# Patient Record
Sex: FEMALE | Race: WHITE | NOT HISPANIC OR LATINO | Employment: FULL TIME | URBAN - NONMETROPOLITAN AREA
[De-identification: names, ages, dates, MRNs, and addresses within clinical notes are randomized per-mention and may not be internally consistent; named-entity substitution may affect disease eponyms.]

---

## 2024-05-23 LAB
EXTERNAL CHLAMYDIA SCREEN: NEGATIVE
EXTERNAL GONORRHEA SCREEN: NEGATIVE
EXTERNAL HEPATITIS B SURFACE ANTIGEN: NEGATIVE
EXTERNAL HEPATITIS C AB: NEGATIVE
EXTERNAL RUBELLA QUALITATIVE: NORMAL
HIV1 P24 AG SERPL QL IA: NORMAL
VZV IGG SER QL: NORMAL

## 2024-09-28 ENCOUNTER — ANESTHESIA EVENT (OUTPATIENT)
Dept: LABOR AND DELIVERY | Facility: HOSPITAL | Age: 29
End: 2024-09-28
Payer: COMMERCIAL

## 2024-09-28 ENCOUNTER — HOSPITAL ENCOUNTER (INPATIENT)
Facility: HOSPITAL | Age: 29
LOS: 2 days | Discharge: HOME | End: 2024-09-30
Attending: OBSTETRICS & GYNECOLOGY | Admitting: OBSTETRICS & GYNECOLOGY
Payer: COMMERCIAL

## 2024-09-28 ENCOUNTER — ANESTHESIA (OUTPATIENT)
Dept: LABOR AND DELIVERY | Facility: HOSPITAL | Age: 29
End: 2024-09-28
Payer: COMMERCIAL

## 2024-09-28 PROBLEM — O60.03 PRETERM LABOR IN THIRD TRIMESTER WITHOUT DELIVERY: Status: RESOLVED | Noted: 2024-09-28 | Resolved: 2024-09-28

## 2024-09-28 PROBLEM — O35.10X0 SUSPECTED FETAL CHROMOSOME ANOMALY AFFECTING ANTEPARTUM CARE OF MOTHER: Status: RESOLVED | Noted: 2024-09-28 | Resolved: 2024-09-28

## 2024-09-28 PROBLEM — Z34.90 PREGNANCY: Status: RESOLVED | Noted: 2024-09-28 | Resolved: 2024-09-28

## 2024-09-28 PROBLEM — O35.10X0 SUSPECTED FETAL CHROMOSOME ANOMALY AFFECTING ANTEPARTUM CARE OF MOTHER: Status: ACTIVE | Noted: 2024-09-28

## 2024-09-28 PROBLEM — Z3A.30 30 WEEKS GESTATION OF PREGNANCY: Status: ACTIVE | Noted: 2024-09-28

## 2024-09-28 PROBLEM — O60.03 PRETERM LABOR IN THIRD TRIMESTER WITHOUT DELIVERY: Status: ACTIVE | Noted: 2024-09-28

## 2024-09-28 PROBLEM — Z34.90 PREGNANCY: Status: ACTIVE | Noted: 2024-09-28

## 2024-09-28 PROBLEM — O99.820 GROUP B STREPTOCOCCAL CARRIAGE COMPLICATING PREGNANCY: Status: ACTIVE | Noted: 2024-09-28

## 2024-09-28 LAB
ABO GROUP BLD: NORMAL
BLD GP AB SCN SERPL QL: NEGATIVE
DEPRECATED RDW RBC AUTO: 43.8 FL (ref 37–54)
ERYTHROCYTE [DISTWIDTH] IN BLOOD BY AUTOMATED COUNT: 13.5 % (ref 12.3–15.4)
HCT VFR BLD AUTO: 34.4 % (ref 34–46.6)
HGB BLD-MCNC: 11.3 G/DL (ref 12–15.9)
MCH RBC QN AUTO: 29.4 PG (ref 26.6–33)
MCHC RBC AUTO-ENTMCNC: 32.8 G/DL (ref 31.5–35.7)
MCV RBC AUTO: 89.4 FL (ref 79–97)
PLATELET # BLD AUTO: 234 10*3/MM3 (ref 140–450)
PMV BLD AUTO: 10.4 FL (ref 6–12)
RBC # BLD AUTO: 3.85 10*6/MM3 (ref 3.77–5.28)
RH BLD: POSITIVE
T&S EXPIRATION DATE: NORMAL
TREPONEMA PALLIDUM IGG+IGM AB [PRESENCE] IN SERUM OR PLASMA BY IMMUNOASSAY: NORMAL
WBC NRBC COR # BLD AUTO: 15.27 10*3/MM3 (ref 3.4–10.8)

## 2024-09-28 PROCEDURE — 51702 INSERT TEMP BLADDER CATH: CPT

## 2024-09-28 PROCEDURE — C1755 CATHETER, INTRASPINAL: HCPCS

## 2024-09-28 PROCEDURE — 25810000003 LACTATED RINGERS PER 1000 ML: Performed by: OBSTETRICS & GYNECOLOGY

## 2024-09-28 PROCEDURE — 88307 TISSUE EXAM BY PATHOLOGIST: CPT | Performed by: OBSTETRICS & GYNECOLOGY

## 2024-09-28 PROCEDURE — 25010000002 MAGNESIUM SULFATE 20 GM/500ML SOLUTION: Performed by: OBSTETRICS & GYNECOLOGY

## 2024-09-28 PROCEDURE — 86780 TREPONEMA PALLIDUM: CPT | Performed by: OBSTETRICS & GYNECOLOGY

## 2024-09-28 PROCEDURE — 85027 COMPLETE CBC AUTOMATED: CPT | Performed by: OBSTETRICS & GYNECOLOGY

## 2024-09-28 PROCEDURE — 86901 BLOOD TYPING SEROLOGIC RH(D): CPT | Performed by: OBSTETRICS & GYNECOLOGY

## 2024-09-28 PROCEDURE — 25010000002 PENICILLIN G POTASSIUM PER 600000 UNITS: Performed by: OBSTETRICS & GYNECOLOGY

## 2024-09-28 PROCEDURE — 25010000002 ROPIVACAINE PER 1 MG

## 2024-09-28 PROCEDURE — 86850 RBC ANTIBODY SCREEN: CPT | Performed by: OBSTETRICS & GYNECOLOGY

## 2024-09-28 PROCEDURE — 86900 BLOOD TYPING SEROLOGIC ABO: CPT | Performed by: OBSTETRICS & GYNECOLOGY

## 2024-09-28 RX ORDER — OXYTOCIN/0.9 % SODIUM CHLORIDE 30/500 ML
125 PLASTIC BAG, INJECTION (ML) INTRAVENOUS ONCE AS NEEDED
Status: DISCONTINUED | OUTPATIENT
Start: 2024-09-28 | End: 2024-09-30 | Stop reason: HOSPADM

## 2024-09-28 RX ORDER — HYDROCORTISONE 25 MG/G
1 CREAM TOPICAL AS NEEDED
Status: DISCONTINUED | OUTPATIENT
Start: 2024-09-28 | End: 2024-09-30 | Stop reason: HOSPADM

## 2024-09-28 RX ORDER — METHYLERGONOVINE MALEATE 0.2 MG/ML
200 INJECTION INTRAVENOUS ONCE AS NEEDED
Status: DISCONTINUED | OUTPATIENT
Start: 2024-09-28 | End: 2024-09-30 | Stop reason: HOSPADM

## 2024-09-28 RX ORDER — EPHEDRINE SULFATE 50 MG/ML
10 INJECTION, SOLUTION INTRAVENOUS
Status: DISCONTINUED | OUTPATIENT
Start: 2024-09-28 | End: 2024-09-28 | Stop reason: HOSPADM

## 2024-09-28 RX ORDER — SODIUM CHLORIDE 0.9 % (FLUSH) 0.9 %
1-10 SYRINGE (ML) INJECTION AS NEEDED
Status: DISCONTINUED | OUTPATIENT
Start: 2024-09-28 | End: 2024-09-30 | Stop reason: HOSPADM

## 2024-09-28 RX ORDER — ONDANSETRON 2 MG/ML
4 INJECTION INTRAMUSCULAR; INTRAVENOUS EVERY 6 HOURS PRN
Status: DISCONTINUED | OUTPATIENT
Start: 2024-09-28 | End: 2024-09-30 | Stop reason: HOSPADM

## 2024-09-28 RX ORDER — LIDOCAINE HYDROCHLORIDE AND EPINEPHRINE 15; 5 MG/ML; UG/ML
INJECTION, SOLUTION EPIDURAL AS NEEDED
Status: DISCONTINUED | OUTPATIENT
Start: 2024-09-28 | End: 2024-09-28 | Stop reason: SURG

## 2024-09-28 RX ORDER — LIDOCAINE HYDROCHLORIDE 20 MG/ML
INJECTION, SOLUTION EPIDURAL; INFILTRATION; INTRACAUDAL; PERINEURAL AS NEEDED
Status: DISCONTINUED | OUTPATIENT
Start: 2024-09-28 | End: 2024-09-28 | Stop reason: SURG

## 2024-09-28 RX ORDER — PROMETHAZINE HYDROCHLORIDE 25 MG/1
25 TABLET ORAL EVERY 6 HOURS PRN
Status: DISCONTINUED | OUTPATIENT
Start: 2024-09-28 | End: 2024-09-30 | Stop reason: HOSPADM

## 2024-09-28 RX ORDER — BISACODYL 10 MG
10 SUPPOSITORY, RECTAL RECTAL DAILY PRN
Status: DISCONTINUED | OUTPATIENT
Start: 2024-09-28 | End: 2024-09-28

## 2024-09-28 RX ORDER — OXYTOCIN/0.9 % SODIUM CHLORIDE 30/500 ML
250 PLASTIC BAG, INJECTION (ML) INTRAVENOUS CONTINUOUS
Status: DISPENSED | OUTPATIENT
Start: 2024-09-28 | End: 2024-09-28

## 2024-09-28 RX ORDER — MAGNESIUM SULFATE HEPTAHYDRATE 40 MG/ML
2 INJECTION, SOLUTION INTRAVENOUS CONTINUOUS
Status: DISCONTINUED | OUTPATIENT
Start: 2024-09-28 | End: 2024-09-28

## 2024-09-28 RX ORDER — HYDROCODONE BITARTRATE AND ACETAMINOPHEN 5; 325 MG/1; MG/1
1 TABLET ORAL EVERY 4 HOURS PRN
Status: DISCONTINUED | OUTPATIENT
Start: 2024-09-28 | End: 2024-09-30 | Stop reason: HOSPADM

## 2024-09-28 RX ORDER — MISOPROSTOL 200 UG/1
600 TABLET ORAL ONCE AS NEEDED
Status: DISCONTINUED | OUTPATIENT
Start: 2024-09-28 | End: 2024-09-30 | Stop reason: HOSPADM

## 2024-09-28 RX ORDER — BETAMETHASONE SOD PHOSPH-WATER 6 MG/ML
12 VIAL (ML) INJECTION ONCE
Status: DISCONTINUED | OUTPATIENT
Start: 2024-09-29 | End: 2024-09-28

## 2024-09-28 RX ORDER — PRENATAL VIT/IRON FUM/FOLIC AC 27MG-0.8MG
1 TABLET ORAL DAILY
Status: DISCONTINUED | OUTPATIENT
Start: 2024-09-28 | End: 2024-09-30 | Stop reason: HOSPADM

## 2024-09-28 RX ORDER — PRENATAL VIT NO.126/IRON/FOLIC 28MG-0.8MG
1 TABLET ORAL DAILY
COMMUNITY

## 2024-09-28 RX ORDER — DOCUSATE SODIUM 100 MG/1
100 CAPSULE, LIQUID FILLED ORAL 2 TIMES DAILY PRN
Status: DISCONTINUED | OUTPATIENT
Start: 2024-09-28 | End: 2024-09-28

## 2024-09-28 RX ORDER — PROMETHAZINE HYDROCHLORIDE 12.5 MG/1
12.5 SUPPOSITORY RECTAL EVERY 6 HOURS PRN
Status: DISCONTINUED | OUTPATIENT
Start: 2024-09-28 | End: 2024-09-30 | Stop reason: HOSPADM

## 2024-09-28 RX ORDER — HYDROXYZINE HYDROCHLORIDE 25 MG/1
50 TABLET, FILM COATED ORAL NIGHTLY PRN
Status: DISCONTINUED | OUTPATIENT
Start: 2024-09-28 | End: 2024-09-30 | Stop reason: HOSPADM

## 2024-09-28 RX ORDER — OXYTOCIN/0.9 % SODIUM CHLORIDE 30/500 ML
999 PLASTIC BAG, INJECTION (ML) INTRAVENOUS ONCE
Status: COMPLETED | OUTPATIENT
Start: 2024-09-28 | End: 2024-09-28

## 2024-09-28 RX ORDER — ACETAMINOPHEN 325 MG/1
650 TABLET ORAL EVERY 6 HOURS PRN
Status: DISCONTINUED | OUTPATIENT
Start: 2024-09-28 | End: 2024-09-30 | Stop reason: HOSPADM

## 2024-09-28 RX ORDER — ACETAMINOPHEN 325 MG/1
650 TABLET ORAL EVERY 4 HOURS PRN
Status: DISCONTINUED | OUTPATIENT
Start: 2024-09-28 | End: 2024-09-28

## 2024-09-28 RX ORDER — HYDROCODONE BITARTRATE AND ACETAMINOPHEN 10; 325 MG/1; MG/1
1 TABLET ORAL EVERY 4 HOURS PRN
Status: DISCONTINUED | OUTPATIENT
Start: 2024-09-28 | End: 2024-09-30 | Stop reason: HOSPADM

## 2024-09-28 RX ORDER — IBUPROFEN 600 MG/1
600 TABLET, FILM COATED ORAL EVERY 6 HOURS PRN
Status: DISCONTINUED | OUTPATIENT
Start: 2024-09-28 | End: 2024-09-30 | Stop reason: HOSPADM

## 2024-09-28 RX ORDER — SODIUM CHLORIDE, SODIUM LACTATE, POTASSIUM CHLORIDE, CALCIUM CHLORIDE 600; 310; 30; 20 MG/100ML; MG/100ML; MG/100ML; MG/100ML
75 INJECTION, SOLUTION INTRAVENOUS CONTINUOUS
Status: DISCONTINUED | OUTPATIENT
Start: 2024-09-28 | End: 2024-09-28

## 2024-09-28 RX ORDER — ONDANSETRON 2 MG/ML
4 INJECTION INTRAMUSCULAR; INTRAVENOUS EVERY 8 HOURS PRN
Status: DISCONTINUED | OUTPATIENT
Start: 2024-09-28 | End: 2024-09-28

## 2024-09-28 RX ORDER — FAMOTIDINE 10 MG/ML
20 INJECTION, SOLUTION INTRAVENOUS ONCE AS NEEDED
OUTPATIENT
Start: 2024-09-28

## 2024-09-28 RX ORDER — DEXMEDETOMIDINE HYDROCHLORIDE 4 UG/ML
INJECTION, SOLUTION INTRAVENOUS AS NEEDED
Status: DISCONTINUED | OUTPATIENT
Start: 2024-09-28 | End: 2024-09-28 | Stop reason: SURG

## 2024-09-28 RX ORDER — NALOXONE HCL 0.4 MG/ML
0.4 VIAL (ML) INJECTION
Status: DISCONTINUED | OUTPATIENT
Start: 2024-09-28 | End: 2024-09-30 | Stop reason: ALTCHOICE

## 2024-09-28 RX ORDER — ONDANSETRON 4 MG/1
8 TABLET, ORALLY DISINTEGRATING ORAL EVERY 8 HOURS PRN
Status: DISCONTINUED | OUTPATIENT
Start: 2024-09-28 | End: 2024-09-28

## 2024-09-28 RX ORDER — PENICILLIN G 3000000 [IU]/50ML
3 INJECTION, SOLUTION INTRAVENOUS EVERY 4 HOURS
Status: DISCONTINUED | OUTPATIENT
Start: 2024-09-28 | End: 2024-09-28

## 2024-09-28 RX ORDER — BISACODYL 10 MG
10 SUPPOSITORY, RECTAL RECTAL DAILY PRN
Status: DISCONTINUED | OUTPATIENT
Start: 2024-09-29 | End: 2024-09-30 | Stop reason: HOSPADM

## 2024-09-28 RX ORDER — CALCIUM CARBONATE 500 MG/1
2 TABLET, CHEWABLE ORAL 3 TIMES DAILY PRN
Status: DISCONTINUED | OUTPATIENT
Start: 2024-09-28 | End: 2024-09-30 | Stop reason: HOSPADM

## 2024-09-28 RX ORDER — ONDANSETRON 4 MG/1
4 TABLET, ORALLY DISINTEGRATING ORAL EVERY 8 HOURS PRN
Status: DISCONTINUED | OUTPATIENT
Start: 2024-09-28 | End: 2024-09-30 | Stop reason: HOSPADM

## 2024-09-28 RX ORDER — MORPHINE SULFATE 2 MG/ML
1 INJECTION, SOLUTION INTRAMUSCULAR; INTRAVENOUS EVERY 4 HOURS PRN
Status: DISCONTINUED | OUTPATIENT
Start: 2024-09-28 | End: 2024-09-30 | Stop reason: ALTCHOICE

## 2024-09-28 RX ORDER — CITRIC ACID/SODIUM CITRATE 334-500MG
30 SOLUTION, ORAL ORAL ONCE
Status: DISCONTINUED | OUTPATIENT
Start: 2024-09-28 | End: 2024-09-28 | Stop reason: HOSPADM

## 2024-09-28 RX ORDER — DOCUSATE SODIUM 100 MG/1
100 CAPSULE, LIQUID FILLED ORAL 2 TIMES DAILY
Status: DISCONTINUED | OUTPATIENT
Start: 2024-09-28 | End: 2024-09-30 | Stop reason: HOSPADM

## 2024-09-28 RX ADMIN — HYDROCODONE BITARTRATE AND ACETAMINOPHEN 1 TABLET: 5; 325 TABLET ORAL at 15:16

## 2024-09-28 RX ADMIN — Medication 250 ML/HR: at 15:17

## 2024-09-28 RX ADMIN — DEXMEDETOMIDINE HYDROCHLORIDE IN 0.9% SODIUM CHLORIDE 4 MCG: 4 INJECTION INTRAVENOUS at 12:19

## 2024-09-28 RX ADMIN — LIDOCAINE HYDROCHLORIDE AND EPINEPHRINE 3 ML: 15; 5 INJECTION, SOLUTION EPIDURAL at 11:52

## 2024-09-28 RX ADMIN — MAGNESIUM SULFATE HEPTAHYDRATE 2 G/HR: 40 INJECTION, SOLUTION INTRAVENOUS at 11:06

## 2024-09-28 RX ADMIN — SODIUM CHLORIDE 5 MILLION UNITS: 900 INJECTION INTRAVENOUS at 11:25

## 2024-09-28 RX ADMIN — Medication 999 ML/HR: at 14:37

## 2024-09-28 RX ADMIN — SODIUM CHLORIDE, POTASSIUM CHLORIDE, SODIUM LACTATE AND CALCIUM CHLORIDE 75 ML/HR: 600; 310; 30; 20 INJECTION, SOLUTION INTRAVENOUS at 11:06

## 2024-09-28 RX ADMIN — LIDOCAINE HYDROCHLORIDE 3 ML: 20 INJECTION, SOLUTION EPIDURAL; INFILTRATION; INTRACAUDAL; PERINEURAL at 11:47

## 2024-09-28 RX ADMIN — DOCUSATE SODIUM 100 MG: 100 CAPSULE, LIQUID FILLED ORAL at 21:58

## 2024-09-28 RX ADMIN — IBUPROFEN 600 MG: 600 TABLET, FILM COATED ORAL at 18:31

## 2024-09-28 RX ADMIN — ROPIVACAINE HYDROCHLORIDE 4 ML/HR: 2 INJECTION, SOLUTION EPIDURAL; INFILTRATION at 12:00

## 2024-09-28 RX ADMIN — Medication 125 ML/HR: at 17:10

## 2024-09-28 RX ADMIN — DEXMEDETOMIDINE HYDROCHLORIDE IN 0.9% SODIUM CHLORIDE 8 MCG: 4 INJECTION INTRAVENOUS at 12:20

## 2024-09-28 NOTE — L&D DELIVERY NOTE
[facility]  Vaginal Delivery Note      Diagnosis   Intrauterine pregnancy @: 30w1d   Labor Status: Spontaneous Onset of Labor    Diagnosis:   30 weeks gestation of pregnancy    Group B streptococcal carriage complicating pregnancy     labor in third trimester with  delivery           Delivery details     Delivery: Vaginal, Spontaneous     YOB: 2024    Time of Birth: 2:35 PM      Anesthesia: Epidural     Delivering clinician: Fred Chung    Forceps?   No   Vacuum? No    Shoulder dystocia present: No      Infant details    Findings: male  infant     Infant observations: Weight: 1610 g (3 lb 8.8 oz)   Length: 16.75  in   Apgars: 2  @ 1 minute /    2  @ 5 minutes  8  @ 10 minutes     Placenta, Cord, and Fluid    Placenta delivered  Spontaneous  at   2024  2:37 PM     Cord: 3V present.   Nuchal Cord?  no   Cord blood obtained: Yes   Umbilical artery pH: 7.32, BE: -0.8     Repair    Episiotomy: None    Lacerations: No   Estimated Blood Loss: 150cc         Delivery Details:    Colleen, a 29 y.o.  at 30w1d, delivered a viable male  infant at 2:35 PM  on 2024 . She was complete and began pushing at 2024  2:34 PM . The fetal vertex was delivered in Vertex  Middle  Occiput  position.The right anterior shoulder was delivered atraumatically by maternal expulsive efforts with the assistance of gentle downward traction. The posterior shoulder delivered with maternal expulsive efforts and gentle upward traction. The remainder of the fetus delivered spontaneously. A large gush of bright red blood followed delivery of the fetus. The baby had poor cry with stimulation, so the cord was clamped x2 and cut after minimal delay. Cord blood was was obtained for analysis and umbilical artery blood for gas analysis. During the third stage of labor, IV pitocin was administered to enhance uterine contraction. The placenta delivered spontaneously, intact, with a three vessel cord at  9/28/2024  2:37 PM . The placenta was inatact with clot along the edge of the cotyledons. Placental disposition was pathology  lab . Fundal massage performed and fundus found to be firm. Perineum, vagina, cervix were inspected, and were noted to be intact. Excellent hemostasis was noted. Needle count correct.     Complications  none    Disposition  Mother to Mother Baby/Postpartum  in stable condition currently.  Baby to NICU  in stable condition currently.      Fred Chung,   09/28/24  16:19 CDT

## 2024-09-28 NOTE — H&P
[facility]  Obstetric History and Physical    Chief Complaint   Patient presents with    Contractions       Subjective     Ms. Toure is a 29-year-old -1-0-3 at 30.1 weeks who arrives as a transfer from [facility] for  labor.  She presented to the outside hospital with regular painful contractions which originally began last night.  They slowed overnight but returned this morning gradually increased every 6 to 8 minutes.  Cervical exam there was 4/80% with a bulging bag of water.  She previously had threatened  labor at 26 weeks and was transferred to [place].  At that time she was 2 cm and ultimately discharged home a few days later.    On arrival patient reports still having painful contractions but no pain in between contractions.  They had stopped some.  She is still having spotting which has been going on since being discharged from [place].  Fetal movement.  No loss of fluid.    Pregnancy is complicated by NIPT that was positive for XYY, fetal urinary tract dilation, and GBS bacteriuria.       The following portions of the patients history were reviewed and updated as appropriate: current medications, allergies, past medical history, past surgical history, past family history, past social history, and problem list .                Past OB History:     OB History    Para Term  AB Living   4 3 1 2 0 3   SAB IAB Ectopic Molar Multiple Live Births   0 0 0 0 0 3      # Outcome Date GA Lbr Shoaib/2nd Weight Sex Type Anes PTL Lv   4 Current            3  20 35w0d       NOE   2  12/15/16 36w6d       NOE   1 Term 2014 37w0d       NOE       Past Medical History: History reviewed. No pertinent past medical history.   Past Surgical History History reviewed. No pertinent surgical history.   Family History: History reviewed. No pertinent family history.   Social History:  reports that she has never smoked. She has never been exposed to tobacco smoke. She has  never used smokeless tobacco.   reports no history of alcohol use.   reports no history of drug use.              Objective     Vital Signs Range for the last 24 hours  Temperature: Temp:  [98 °F (36.7 °C)] 98 °F (36.7 °C)   Temp Source: Temp src: Oral   BP: BP: ()/(67-83) 117/67   Pulse: Heart Rate:  [] 97   Respirations: Resp:  [18] 18   SPO2: SpO2:  [98 %-100 %] 100 %   O2 Amount (l/min):     O2 Devices Device (Oxygen Therapy): room air   Weight: Weight:  [70.3 kg (155 lb)] 70.3 kg (155 lb)     Physical Examination: General appearance - alert, well appearing, and in no distress    Presentation: vertex   Cervix: Exam by:     Dilation: Cervical Dilation (cm): 8   Effacement: Cervical Effacement: %   Station:       Fetal Heart Rate Assessment   Method:     Beats/min:     Baseline:  135   Variability:  mod   Accels:  none   Decels:  none   Tracing Category:       Uterine Assessment   Method:     Frequency (min):  Q4-7min   Ctx Count in 10 min:     Duration:     Intensity:     Intensity by IUPC:     Resting Tone:     Resting Tone by IUPC:     [place] Units:       Laboratory Results:   Admission on 09/28/2024   Component Date Value Ref Range Status    WBC 09/28/2024 15.27 (H)  3.40 - 10.80 10*3/mm3 Final    RBC 09/28/2024 3.85  3.77 - 5.28 10*6/mm3 Final    Hemoglobin 09/28/2024 11.3 (L)  12.0 - 15.9 g/dL Final    Hematocrit 09/28/2024 34.4  34.0 - 46.6 % Final    MCV 09/28/2024 89.4  79.0 - 97.0 fL Final    MCH 09/28/2024 29.4  26.6 - 33.0 pg Final    MCHC 09/28/2024 32.8  31.5 - 35.7 g/dL Final    RDW 09/28/2024 13.5  12.3 - 15.4 % Final    RDW-SD 09/28/2024 43.8  37.0 - 54.0 fl Final    MPV 09/28/2024 10.4  6.0 - 12.0 fL Final    Platelets 09/28/2024 234  140 - 450 10*3/mm3 Final    ABO Type 09/28/2024 O   Final    RH type 09/28/2024 Positive   Final    Antibody Screen 09/28/2024 Negative   Final    T&S Expiration Date 09/28/2024 10/1/2024 11:59:59 PM   Final    External Chlamydia Screen  "2024 Negative   Final    External Gonorrhea Screen 2024 Negative   Final    External Hepatitis C Ab 2024 Negative   Final    External Hepatitis B Surface Ag 2024 Negative   Final    External Rubella Qual 2024 Non-Immune   Final    External HIV Antibody 2024 Non-Reactive   Final    External Varicella Zoster IgG 2024 Immune   Final           Outside records that were sent with patient were reviewed.    Ultrasound from 24 showed fetus at 1194gm/52% with \"mild right pyelectasis \"noted.    Assessment & Plan        labor in third trimester without delivery    30 weeks gestation of pregnancy    Group B streptococcal carriage complicating pregnancy    Suspected fetal chromosome anomaly affecting antepartum care of mother      Assessment:  1.  Ms. Toure is a 29-year-old -2-0-3 at 30.1 weeks who presents in  labor.  Prior to transfer cervix was 4 cm, on arrival she was 6-7cm.  Magnesium sulfate and betamethasone course was started prior to transfer.  She received a 6 g bolus of magnesium sulfate prior to transfer.  She had a positive GBS urine but not yet started on antibiotics.  Plan:  1.  Admit to L&D, to [facility]  2.  Continue magnesium sulfate 2 g/h for fetal neuroprophylaxis.  3.  Penicillin protocol for GBS prophylaxis  4.  CBC, T palladium, type and screen  5.  Anesthesia consulted for epidural placement  6.  NICU notified   7.  Plan of care discussed with patient and family.  RIsks, benefits of treatment plan have been discussed. All questions have been answered.        Fred Chung DO  2024  13:32 CDT    "

## 2024-09-29 PROBLEM — Z34.90 PREGNANCY: Status: ACTIVE | Noted: 2024-09-29

## 2024-09-29 LAB
HCT VFR BLD AUTO: 28.8 % (ref 34–46.6)
HGB BLD-MCNC: 9.5 G/DL (ref 12–15.9)

## 2024-09-29 PROCEDURE — 85014 HEMATOCRIT: CPT | Performed by: OBSTETRICS & GYNECOLOGY

## 2024-09-29 PROCEDURE — 85018 HEMOGLOBIN: CPT | Performed by: OBSTETRICS & GYNECOLOGY

## 2024-09-29 RX ADMIN — DOCUSATE SODIUM 100 MG: 100 CAPSULE, LIQUID FILLED ORAL at 19:17

## 2024-09-29 RX ADMIN — DOCUSATE SODIUM 100 MG: 100 CAPSULE, LIQUID FILLED ORAL at 08:01

## 2024-09-29 RX ADMIN — IBUPROFEN 600 MG: 600 TABLET, FILM COATED ORAL at 04:46

## 2024-09-29 RX ADMIN — PRENATAL VIT W/ FE FUMARATE-FA TAB 27-0.8 MG 1 TABLET: 27-0.8 TAB at 08:01

## 2024-09-29 RX ADMIN — IBUPROFEN 600 MG: 600 TABLET, FILM COATED ORAL at 14:44

## 2024-09-29 NOTE — LACTATION NOTE
This note was copied from a baby's chart.  Name:Kumar  Day:0  Dx:  delivery  Birth Gestation:30w1d  Adjusted Gestation:30w1d  Birth weight: 3-8.8 (1610g)  Last weight:              % of weight loss:    Feeding Orders:NPO  Maternal Hx:,  delivery x3 ( 35w, 36w and current)   Prenatal Medications:PNV  Pump available: NEEDS RX TO [facility]  Pumping history in the last 24 hours: pumping initiated at 3.5 hrs collected 0.1 ml    Mom is first time breastfeeder, states she briefly attempted with first child but was younger and was uncomfortable with breastfeeding. States she has several friends that have since  and she feels more comfortable and encouraged to breastfeed now. Donor breastmilk consent signed and placed on infant's chart.   NICU folder given and reviewed. Discussed use of pump and care of pump parts, steam bags and sterile bottles. Pumping initiated, using 21 mm flanges, mother likely needs 19 mm flange inserts. Collected 0.1 ml. Drawn up into colostrum care syringe, encouraged parents to take to nicu within 1 hr or as soon as possible (currently shift change). Questions denied at this time, will follow up  Monday.     **Breast pump Kit Care Cleaning-Drying-Storing handout by [employer]   **Collecting,Labeling,Storing and Transporting Breast milk for Babies in the NICU handout  **Hand Expression handout provided by [employer]   **Breast Engorgement Handout provided by [employer]   **Check list for Essentials of Positioning and Latch-on  Handout provided by [employer]  **The Many Benefits of Breastfeeding handout  **Hands on Pumping Handout provided by [employer]  **Increasing breast milk supply for a Baby in the NICU handout provided by [employer]  **My Breast pumping Log Handout  **When Will my Milk Come In/ handout provided  By the first latch  **Human Milk Storage for   Infants handout  **Care Plan for Breastfeeding your  Baby handout  **Using a Nipple Shield handout provided by [employer]  **Providing Milk for Your NICU Baby Handout  **Quick Clean Micro Steam bags by [employer]  ** Freshly Expressed Breastmilk Storage Guidelines for Healthy Term Babies Magnet by [employer]    Providing Milk for your NICU Baby  The following is a list of what to expect after the birth of your baby and how a mother's milk can make all the difference.    THE BENEFITS OF MOTHER'S OWN MILK FOR A NICU BABY  * In addition to all the benefits breast milk provides all babies (see list on other handout), NICU babies receive extra benefits.  * Your milk is easier on baby's tummy which may be less ready to digest food.  *  milk has more beneficial fat for growth, protein, and essential minerals.  * Mother's milk allows for better eye development and function  *Babies born early must finish their body tissue development after birth. The special protein in mother's milk allows for optimal body development  *Mother's milk is a natural antibiotic that protects baby from infections; babies fed breast milk are much less likely to develop gastrointestinal illnesses, including the dangerous infection necrotizing enterocolitis (NEC)  *Digesting breast milk is much easier for the baby. He is them able to use the nova calories he takes in for growth and development. It provides a fast fuel that is easily absorbed for use in critical body functions.    By providing human milk for your baby, you are providing a powerful medication that no hospital can make!!    HOW TO BEGIN:  *If you feel up to it, our lactation staff will help you begin pumping your milk as soon as possible after delivery. Our goal is to begin within 4 hours of baby's birth. If this is not possible, we must make every effort to institute pumping within 12 hours of delivery.    *Do not be discouraged by small amounts!  This is super-concentrated nutrition and all baby needs is in those magical drops. In your folder there is a guide regarding target amounts and a pumping log to record your efforts at providing milk for your baby.

## 2024-09-29 NOTE — PROGRESS NOTES
"[facility]  Vaginal Delivery Progress Note    Subjective   Subjective  Postpartum Day 1: Vaginal Delivery    The patient feels well.  Her pain is well controlled with nonsteroidal anti-inflammatory drugs.   She is ambulating well.  Patient describes her bleeding as thin lochia.    Breastfeeding: pumping for baby in NICU    Objective     Objective   Vital Signs Range for the last 24 hours  Temperature: Temp:  [97.7 °F (36.5 °C)-98.9 °F (37.2 °C)] 97.9 °F (36.6 °C)   Temp Source: Temp src: Oral   BP: BP: ()/(54-93) 107/68   Pulse: Heart Rate:  [] 67   Respirations: Resp:  [16-20] 18   SPO2: SpO2:  [98 %-100 %] 98 %   O2 Amount (l/min):     O2 Devices Device (Oxygen Therapy): room air   Weight: Weight:  [70.3 kg (155 lb)] 70.3 kg (155 lb)     Admit Height:  Height: 160 cm (63\")    Physical Exam:  General:  no acute distresss.    Lab results reviewed:  Yes   Rubella:  No results found for: \"RUBELLAIGGIN\" Nurse Transcribed from prenatal record --    External Rubella Qual   Date Value Ref Range Status   2024 Non-Immune  Final     Rh Status:    RH type   Date Value Ref Range Status   2024 Positive  Final     Immunizations: There is no immunization history for the selected administration types on file for this patient.    Assessment & Plan   Assessment & Plan    30 weeks gestation of pregnancy    Group B streptococcal carriage complicating pregnancy     labor in third trimester with  delivery      Colleen Toure is Day 1  post-partum  Vaginal, Spontaneous   .      Plan:  Continue current care Rubella Non Immune: needs immunization. Anticipate discharge home tomorrow.       Fred Chung,   2024  10:16 CDT      "

## 2024-09-29 NOTE — PLAN OF CARE
Goal Outcome Evaluation:  Plan of Care Reviewed With: patient, spouse        Progress: improving  Outcome Evaluation: VSS: FF/ML/U2 with scant lochia, pt voiding and ambulating, shower today, motrin given for pain, depression score 11- consult in, patient pupming, patient going to NICU to see infant

## 2024-09-29 NOTE — PLAN OF CARE
Goal Outcome Evaluation:  Plan of Care Reviewed With: patient        Progress: improving  Outcome Evaluation: VSS, FFML U2, scant rubra, voiding, ambulating, complained of mild pain this shift, ibuprofen given

## 2024-09-29 NOTE — PAYOR COMM NOTE
"ADMIT INPT 24  UR  [phone]    Colleen Toure (29 y.o. Female)       Date of Birth   1995    Social Security Number       Address   [address] [address]    Home Phone   [phone]    MRN   [id number]       Congregational   Other    Marital Status   Single                            Admission Date   24    Admission Type   Elective    Admitting Provider   Fred Chung DO    Attending Provider   Fred Chung DO    Department, Room/Bed   [facility] MOTHER BABY 2A, M238/1       Discharge Date       Discharge Disposition       Discharge Destination                                 Attending Provider: Fred Chung DO    Allergies: No Known Allergies    Isolation: None   Infection: None   Code Status: CPR    Ht: 160 cm (63\")   Wt: 70.3 kg (155 lb)    Admission Cmt: None   Principal Problem:  labor in third trimester without delivery [O60.03]                   Active Insurance as of 2024       Primary Coverage       Payor Plan Insurance Group Employer/Plan Group    [facility] [employer]        Payor Plan Address Payor Plan Phone Number Payor Plan Fax Number Effective Dates    [address] [address] [phone]  2024 - None Entered    [facility] [address]         Subscriber Name Subscriber Birth Date Member ID       COLLEEN TOURE 1995 [id number]                     Emergency Contacts            No emergency contacts on file.                 History & Physical        Fred Chung DO at 24 1101          [facility]  Obstetric History and Physical    Chief Complaint   Patient presents with    Contractions       Subjective    Ms. Toure is a 29-year-old -1-0-3 at 30.1 weeks who arrives as a transfer from [facility] for  labor.  She presented to the outside hospital with regular painful contractions which originally began last night.  They slowed overnight but returned this morning gradually increased " every 6 to 8 minutes.  Cervical exam there was 4/80% with a bulging bag of water.  She previously had threatened  labor at 26 weeks and was transferred to [place].  At that time she was 2 cm and ultimately discharged home a few days later.    On arrival patient reports still having painful contractions but no pain in between contractions.  They had stopped some.  She is still having spotting which has been going on since being discharged from [place].  Fetal movement.  No loss of fluid.    Pregnancy is complicated by NIPT that was positive for XYY, fetal urinary tract dilation, and GBS bacteriuria.       The following portions of the patients history were reviewed and updated as appropriate: current medications, allergies, past medical history, past surgical history, past family history, past social history, and problem list .                Past OB History:     OB History    Para Term  AB Living   4 3 1 2 0 3   SAB IAB Ectopic Molar Multiple Live Births   0 0 0 0 0 3      # Outcome Date GA Lbr Shoaib/2nd Weight Sex Type Anes PTL Lv   4 Current            3  20 35w0d       NOE   2  12/15/16 36w6d       NOE   1 Term 2014 37w0d       NOE       Past Medical History: History reviewed. No pertinent past medical history.   Past Surgical History History reviewed. No pertinent surgical history.   Family History: History reviewed. No pertinent family history.   Social History:  reports that she has never smoked. She has never been exposed to tobacco smoke. She has never used smokeless tobacco.   reports no history of alcohol use.   reports no history of drug use.              Objective    Vital Signs Range for the last 24 hours  Temperature: Temp:  [98 °F (36.7 °C)] 98 °F (36.7 °C)   Temp Source: Temp src: Oral   BP: BP: ()/(67-83) 117/67   Pulse: Heart Rate:  [] 97   Respirations: Resp:  [18] 18   SPO2: SpO2:  [98 %-100 %] 100 %   O2 Amount (l/min):     O2 Devices  Device (Oxygen Therapy): room air   Weight: Weight:  [70.3 kg (155 lb)] 70.3 kg (155 lb)     Physical Examination: General appearance - alert, well appearing, and in no distress    Presentation: vertex   Cervix: Exam by:     Dilation: Cervical Dilation (cm): 8   Effacement: Cervical Effacement: %   Station:       Fetal Heart Rate Assessment   Method:     Beats/min:     Baseline:  135   Variability:  mod   Accels:  none   Decels:  none   Tracing Category:       Uterine Assessment   Method:     Frequency (min):  Q4-7min   Ctx Count in 10 min:     Duration:     Intensity:     Intensity by IUPC:     Resting Tone:     Resting Tone by IUPC:     Andrews Units:       Laboratory Results:   Admission on 09/28/2024   Component Date Value Ref Range Status    WBC 09/28/2024 15.27 (H)  3.40 - 10.80 10*3/mm3 Final    RBC 09/28/2024 3.85  3.77 - 5.28 10*6/mm3 Final    Hemoglobin 09/28/2024 11.3 (L)  12.0 - 15.9 g/dL Final    Hematocrit 09/28/2024 34.4  34.0 - 46.6 % Final    MCV 09/28/2024 89.4  79.0 - 97.0 fL Final    MCH 09/28/2024 29.4  26.6 - 33.0 pg Final    MCHC 09/28/2024 32.8  31.5 - 35.7 g/dL Final    RDW 09/28/2024 13.5  12.3 - 15.4 % Final    RDW-SD 09/28/2024 43.8  37.0 - 54.0 fl Final    MPV 09/28/2024 10.4  6.0 - 12.0 fL Final    Platelets 09/28/2024 234  140 - 450 10*3/mm3 Final    ABO Type 09/28/2024 O   Final    RH type 09/28/2024 Positive   Final    Antibody Screen 09/28/2024 Negative   Final    T&S Expiration Date 09/28/2024 10/1/2024 11:59:59 PM   Final    External Chlamydia Screen 05/23/2024 Negative   Final    External Gonorrhea Screen 05/23/2024 Negative   Final    External Hepatitis C Ab 05/23/2024 Negative   Final    External Hepatitis B Surface Ag 05/23/2024 Negative   Final    External Rubella Qual 05/23/2024 Non-Immune   Final    External HIV Antibody 05/23/2024 Non-Reactive   Final    External Varicella Zoster IgG 05/23/2024 Immune   Final           Outside records that were sent with patient  "were reviewed.    Ultrasound from 24 showed fetus at 1194gm/52% with \"mild right pyelectasis \"noted.    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  Ms. Toure is a 29-year-old -2-0-3 at 30.1 weeks who presents in  labor.  Prior to transfer cervix was 4 cm, on arrival she was 6-7cm.  Magnesium sulfate and betamethasone course was started prior to transfer.  She received a 6 g bolus of magnesium sulfate prior to transfer.  She had a positive GBS urine but not yet started on antibiotics.  Plan:  1.  Admit to L&D, to [facility]  2.  Continue magnesium sulfate 2 g/h for fetal neuroprophylaxis.  3.  Penicillin protocol for GBS prophylaxis  4.  CBC, T palladium, type and screen  5.  Anesthesia consulted for epidural placement  6.  NICU notified   7.  Plan of care discussed with patient and family.  RIsks, benefits of treatment plan have been discussed. All questions have been answered.        Fred Chung DO  2024  13:32 CDT      Electronically signed by Fred Chung DO at 24 1332          Operative/Procedure Notes (all)        Fred Chung DO at 24 1448  Version 2 of [address] [place] [address]  Vaginal Delivery Note      Diagnosis   Intrauterine pregnancy @: 30w1d   Labor Status: Spontaneous Onset of Labor    Diagnosis:   30 weeks gestation of pregnancy    Group B streptococcal carriage complicating pregnancy     labor in third trimester with  delivery           Delivery details     Delivery: Vaginal, Spontaneous     YOB: 2024    Time of Birth: 2:35 PM      Anesthesia: Epidural     Delivering clinician: Fred Chung    Forceps?   No   Vacuum? No    Shoulder dystocia present: No      Infant details    Findings: male  infant   "   Infant observations: Weight: 1610 g (3 lb 8.8 oz)   Length: 16.75  in   Apgars: 2  @ 1 minute /    2  @ 5 minutes  8  @ 10 minutes     Placenta, Cord, and Fluid    Placenta delivered  Spontaneous  at   2024  2:37 PM     Cord: 3V present.   Nuchal Cord?  no   Cord blood obtained: Yes   Umbilical artery pH: 7.32, BE: -0.8     Repair    Episiotomy: None    Lacerations: No   Estimated Blood Loss: 150cc         Delivery Details:    shahida Macias 29 y.o.  at 30w1d, delivered a viable male  infant at 2:35 PM  on 2024 . She was complete and began pushing at 2024  2:34 PM . The fetal vertex was delivered in Vertex  Middle  Occiput  position.The right anterior shoulder was delivered atraumatically by maternal expulsive efforts with the assistance of gentle downward traction. The posterior shoulder delivered with maternal expulsive efforts and gentle upward traction. The remainder of the fetus delivered spontaneously. A large gush of bright red blood followed delivery of the fetus. The baby had poor cry with stimulation, so the cord was clamped x2 and cut after minimal delay. Cord blood was was obtained for analysis and umbilical artery blood for gas analysis. During the third stage of labor, IV pitocin was administered to enhance uterine contraction. The placenta delivered spontaneously, intact, with a three vessel cord at 2024  2:37 PM . The placenta was inatact with clot along the edge of the cotyledons. Placental disposition was pathology  lab . Fundal massage performed and fundus found to be firm. Perineum, vagina, cervix were inspected, and were noted to be intact. Excellent hemostasis was noted. Needle count correct.     Complications  none    Disposition  Mother to Mother Baby/Postpartum  in stable condition currently.  Baby to NICU  in stable condition currently.      Fred Chung DO  24  16:19 CDT      Electronically signed by Fred Chung DO at 24 8065        Fred Chung, DO at 24 1448  Version 1 of 2         [facility]  Vaginal Delivery Note      Diagnosis   Intrauterine pregnancy @: 30w1d   Labor Status: Spontaneous Onset of Labor    Diagnosis:   30 weeks gestation of pregnancy    Group B streptococcal carriage complicating pregnancy     labor in third trimester with  delivery           Delivery details     Delivery: Vaginal, Spontaneous     YOB: 2024    Time of Birth: 2:35 PM      Anesthesia: Epidural     Delivering clinician: Fred Chung    Forceps?   No   Vacuum? No    Shoulder dystocia present: No      Infant details    Findings: male  infant     Infant observations: Weight: 1610 g (3 lb 8.8 oz)   Length: 16.75  in   Apgars: 2  @ 1 minute /    2  @ 5 minutes     Placenta, Cord, and Fluid    Placenta delivered  Spontaneous  at   2024  2:37 PM     Cord: 3V present.   Nuchal Cord?  no   Cord blood obtained: Yes   Umbilical artery pH: 7.32, BE: -0.8     Repair    Episiotomy: None    Lacerations: No   Estimated Blood Loss: 150cc         Delivery Details:    shahida Macias 29 y.o.  at 30w1d, delivered a viable male  infant at 2:35 PM  on 2024 . She was complete and began pushing at 2024  2:34 PM . The fetal vertex was delivered in Vertex  Middle  Occiput  position.The right anterior shoulder was delivered atraumatically by maternal expulsive efforts with the assistance of gentle downward traction. The posterior shoulder delivered with maternal expulsive efforts and gentle upward traction. The remainder of the fetus delivered spontaneously. A large gush of bright red blood followed delivery of the fetus. The baby had poor cry with stimulation, so the cord was clamped x2 and cut after minimal delay. Cord blood was was obtained for analysis and umbilical artery blood for gas analysis. During the third stage of labor, IV pitocin was administered to enhance uterine contraction. The placenta delivered  "spontaneously, intact, with a three vessel cord at 9/28/2024  2:37 PM . The placenta was inatact with clot along the edge of the cotyledons. Placental disposition was pathology  lab . Fundal massage performed and fundus found to be firm. Perineum, vagina, cervix were inspected, and were noted to be intact. Excellent hemostasis was noted. Needle count correct.     Complications  none    Disposition  Mother to Mother Baby/Postpartum  in stable condition currently.  Baby to NICU  in stable condition currently.      Fred Chung DO  09/28/24  16:19 CDT      Electronically signed by Fred Chung DO at 09/28/24 1620          Physician Progress Notes (all)        Fred Chung DO at 09/29/24 1016          [facility]  Vaginal Delivery Progress Note    Subjective   Subjective  Postpartum Day 1: Vaginal Delivery    The patient feels well.  Her pain is well controlled with nonsteroidal anti-inflammatory drugs.   She is ambulating well.  Patient describes her bleeding as thin lochia.    Breastfeeding: pumping for baby in NICU    Objective     Objective   Vital Signs Range for the last 24 hours  Temperature: Temp:  [97.7 °F (36.5 °C)-98.9 °F (37.2 °C)] 97.9 °F (36.6 °C)   Temp Source: Temp src: Oral   BP: BP: ()/(54-93) 107/68   Pulse: Heart Rate:  [] 67   Respirations: Resp:  [16-20] 18   SPO2: SpO2:  [98 %-100 %] 98 %   O2 Amount (l/min):     O2 Devices Device (Oxygen Therapy): room air   Weight: Weight:  [70.3 kg (155 lb)] 70.3 kg (155 lb)     Admit Height:  Height: 160 cm (63\")    Physical Exam:  General:  no acute distresss.    Lab results reviewed:  Yes   Rubella:  No results found for: \"RUBELLAIGGIN\" Nurse Transcribed from prenatal record --    External Rubella Qual   Date Value Ref Range Status   05/23/2024 Non-Immune  Final     Rh Status:    RH type   Date Value Ref Range Status   09/28/2024 Positive  Final     Immunizations: There is no immunization history for the selected administration " types on file for this patient.    Assessment & Plan   Assessment & Plan    30 weeks gestation of pregnancy    Group B streptococcal carriage complicating pregnancy     labor in third trimester with  delivery      Colleen Toure is Day 1  post-partum  Vaginal, Spontaneous   .      Plan:  Continue current care Rubella Non Immune: needs immunization. Anticipate discharge home tomorrow.       Fred Chung DO  2024  10:16 CDT        Electronically signed by Fred Chung DO at 24 1018

## 2024-09-30 VITALS
HEIGHT: 63 IN | WEIGHT: 155 LBS | HEART RATE: 61 BPM | OXYGEN SATURATION: 98 % | TEMPERATURE: 97.7 F | SYSTOLIC BLOOD PRESSURE: 113 MMHG | RESPIRATION RATE: 18 BRPM | BODY MASS INDEX: 27.46 KG/M2 | DIASTOLIC BLOOD PRESSURE: 79 MMHG

## 2024-09-30 PROBLEM — Z34.90 PREGNANCY: Status: RESOLVED | Noted: 2024-09-29 | Resolved: 2024-09-30

## 2024-09-30 PROBLEM — O99.820 GROUP B STREPTOCOCCAL CARRIAGE COMPLICATING PREGNANCY: Status: RESOLVED | Noted: 2024-09-28 | Resolved: 2024-09-30

## 2024-09-30 PROBLEM — Z3A.30 30 WEEKS GESTATION OF PREGNANCY: Status: RESOLVED | Noted: 2024-09-28 | Resolved: 2024-09-30

## 2024-09-30 RX ORDER — IBUPROFEN 600 MG/1
600 TABLET, FILM COATED ORAL EVERY 6 HOURS PRN
Qty: 60 TABLET | Refills: 2 | Status: SHIPPED | OUTPATIENT
Start: 2024-09-30

## 2024-09-30 RX ADMIN — PRENATAL VIT W/ FE FUMARATE-FA TAB 27-0.8 MG 1 TABLET: 27-0.8 TAB at 09:44

## 2024-09-30 RX ADMIN — DOCUSATE SODIUM 100 MG: 100 CAPSULE, LIQUID FILLED ORAL at 09:44

## 2024-09-30 NOTE — LACTATION NOTE
This note was copied from a baby's chart.  Name:Kumar  Day:2  Dx:  delivery  Birth Gestation:30w1d  Adjusted Gestation:30w3d  Birth weight: 3-8.8 (1610g)  Last weight:     3-3.9 (1470g)  % of weight loss:-8.69%    Feeding Orders:8 ml MBM or DBM every 3 hours  Maternal Hx:,  delivery x3 ( 35w, 36w and current)   Prenatal Medications:PNV  Pump available:  RX faxed to [email or web address]  Pumping history in the last 24 hours: pumping every 3 hours. Left breast collecting drops, right breast last session yield of 0.4 ml    F/u with mother to discuss pumping progress. Mother reports left breast is not producing, right breast is beginning to increase. Discussed normalcy of this and reassurance offered. With permission, hand expression performed on left breast, expression is slow but multiple drops did express. Mother plans for discharge today and considering going home for now. Discussed option of hospitality stay when she desires. Breastfeeding after discharge handout given and reviewed. Lanolin, spectra pump bottle adaptors and haakaa  provided as well. RX faxed to [facility] for spectra pump. Advised mother to request flange inserts for spectra  will need 19 mm flanges. Mother does voice some pain with pumping on right breast, has abraded ring around areola, discussed this is sign of too large flange, currently using 21 mm flanges, unfortunately this is the smallest available in hospital. Questions denied at this time. Encouragement and support provided.     Signs of Milk: Fullness, firmness, heaviness of breasts, leaking of milk.  Signs of Good Feed: Breast fullness prior to feed, breasts soft and comfortable after feeding. Infant content after feeding: calm, sleepy, relaxed and without continued hunger cues.  Signs of Plugged Ducts, Engorgement and Mastitis: Plugged ducts (milk entrapment in milk ducts)- small tender knots that often feel like little beans under breast tissue, usually  tender. Massage on these areas of concern while breastfeeding or pumping to promote emptying.   Engorgement- fluid or excess milk, breasts become uncomfortably full, tight, firm (compare to the firmness of your cheek (mild), chin (moderate) or forehead (severe). First line of treatment should be to BREASTFEED, if breasts remain full feeling after a feeding, it may be necessary to pump, ONLY UNTIL BREASTS ARE SOFT AND COMFORTABLE. DO NOT OVER PUMP (complete emptying of breasts can trigger even more milk which will cause continued, recurrent Engorgement).  Mastitis- Infection of the breast tissue, most often caused by plugged ducts that are not adequately treated by emptying, recurrent trauma to nipples or breasts (cracked or bleeding nipples). Signs: redness, swelling, tender knots or fever to breasts as well as generalized fever >101 degrees F that is often sudden onset. Treatment of mastitis, BREASTFEED! Pump after breastfeeding to achieve COMPLETE emptying of affected breast, utilizing massage to areas of concern, may use cold compress to affected area only after breast emptying. May take anti-inflammatories i.e. Ibuprofen, Motrin. CALL your OB for assessment and continued treatment with Antibiotics to adequately treat mastitis.  Infant Care: Over the first 2 weeks it is important to keep record of infant's feeding routine (feeding times and durations), wet and dirty diaper frequency, stool color and any spit ups that may occur.  Keep in mind, ALL babies will lose some weight initially (usually no more than 10% by day 3). Until infant returns to/ surpasses birth weight (which can take up to 2 weeks), it is important to offer feedings AT LEAST EVERY 3 HOURS. Remember, if you choose to supplement infant with formula or previously pumped milk, you should always pump in replacement of that feeding in order to promote and maintain a healthy milk supply!  Maternal Care: REST, sleep when the infant sleeps, stay hydrated  (water is optimal) drink to thirst, increase caloric intake - breastfeeding mother's need an ADDITIONAL 500 calories per day , eat 3 meals/day as well as snacks in between, limit CAFFIENE intake to 2 cups/day. Ask your significant other, family members or friends for help when needed, taking advantage of meal trains, allowing others to help with laundry, house chores, etc can help you focus on what is most important early on after delivery… you and your infant, and breastfeeding!   Medications to CONTINUE: Prenatal Vitamins are important to continue taking while breastfeeding. Fish oil, magnesium/calcium supplements often are helpful to support Mothers and their milk supply as well. Tylenol, Ibuprofen, regular Zyrtec, Claritin are SAFE if you suffer from seasonal allergies. Flonase is safe and often an effective medication to take if suffering from sinus drainage/pressure.  Medications to AVOID: Benadryl, Sudafed, any medications including “DM” or have a drying effect to sinus drainage will also dry a mother's milk up. Birth control- your OB will want to address birth control options with you usually around 4-6 weeks postpartum, be sure to notify your MD if you continue to breastfeed as some birth controls may significantly decrease your milk supply. Herbals- some herbs may also decrease your milk supply: PEPPERMINT, MENTHOL in any form (candies, essential oils, teas, etc), so check labels and avoid using in excess.  Pumping: Although we encourage you to focus on breastfeeding over the first 2-4 weeks, you will need to plan to begin pumping. We do recommend implementing pumping by the time infant is 4 weeks old. Pump 2-3 times per day immediately AFTER breastfeeding, it is normal to collect very small amounts initially, but the more consistently you pump, the more you will begin to collect. Store collected milk in refrigerator or freezer. You should also begin offering infant a bottle around 4 weeks. Remember to use  slow flow nipples and PACE the bottle-feed. A bottle feed should take about as long as a breastfeeding session.

## 2024-09-30 NOTE — DISCHARGE SUMMARY
Discharge Summary     Raleigh Toure  : 1995  MRN: [id number]  CSN: [id number]    Date of Admission: 2024   Date of Discharge:  2024   Delivering Physician: Fred Chung        Admission Diagnosis: Pregnancy [Z34.90]   labor in third trimester without delivery [O60.03]   labor in third trimester without delivery [O60.03]   Discharge Diagnosis: Pregnancy at 30w1d - delivered       Procedures: 2024  - Vaginal, Spontaneous       Hospital Course  Patient is a 29 y.o.  who at 30w1d had a uncomplicated vaginal delivery.  Her postpartum course was without complications.  On PPD #2 she was ready for discharge.  She had normal lochia and pain was well controlled with oral medications.    Infant  male  fetus weighing 1610 g (3 lb 8.8 oz)   Apgars -  2 @ 1 minute /  2 @ 5 minutes.    Discharge labs  Lab Results   Component Value Date    WBC 15.27 (H) 2024    HGB 9.5 (L) 2024    HCT 28.8 (L) 2024     2024       Discharge Medications     Discharge Medications        New Medications        Instructions Start Date   ibuprofen 600 MG tablet  Commonly known as: ADVIL,MOTRIN   600 mg, Oral, Every 6 Hours PRN             Continue These Medications        Instructions Start Date   prenatal (CLASSIC) vitamin 28-0.8 MG tablet tablet  Generic drug: prenatal vitamin   1 tablet, Oral, Daily               Discharge Disposition Home or Self Care   Condition on Discharge: good   Follow-up: 4 weeks with [facility], if no appointment, please call office     Shayna Currie MD  2024

## 2024-09-30 NOTE — PLAN OF CARE
Goal Outcome Evaluation:         VSS. FF, ML, U2, scant lochia. No pain meds required overnight. Pumping and visiting infant in nicu.

## 2024-09-30 NOTE — PROGRESS NOTES
"[facility]  Vaginal Delivery Progress Note    Subjective   Postpartum Day 2: Vaginal Delivery    The patient feels well.  Her pain is well controlled with prescribed pain medications.   She is ambulating well.  Patient describes her bleeding as thin lochia.    Breastfeeding: has not attempted yet.    Objective     Vital Signs Range for the last 24 hours  Temperature: Temp:  [97.7 °F (36.5 °C)-98.2 °F (36.8 °C)] 97.7 °F (36.5 °C)   Temp Source: Temp src: Oral   BP: BP: (106-113)/(69-79) 113/79   Pulse: Heart Rate:  [61-71] 61   Respirations: Resp:  [18] 18   SPO2: SpO2:  [98 %-99 %] 98 %   O2 Amount (l/min):     O2 Devices Device (Oxygen Therapy): room air   Weight:       Admit Height:  Height: 160 cm (63\")      Physical Exam:  General:  no acute distresss.  Abdomen: Fundus: appropriate, firm, non tender  Extremities: normal, atraumatic, no cyanosis, and trace edema.     Lab results reviewed:  Yes   Rubella:  No results found for: \"RUBELLAIGGIN\" Nurse Transcribed from prenatal record --  No components found for: \"EXTRUBELQUAL\"  Rh Status:    RH type   Date Value Ref Range Status   2024 Positive  Final     Immunizations: There is no immunization history for the selected administration types on file for this patient.    Assessment & Plan       30 weeks gestation of pregnancy    Group B streptococcal carriage complicating pregnancy     labor in third trimester with  delivery    Pregnancy      Colleen Toure is Day 2  post-partum  Vaginal, Spontaneous   .      Plan:  Discharge home with standard precautions and return to clinic in 4-6 weeks.      Shayna Currie MD  2024  12:21 CDT  "

## 2024-09-30 NOTE — CASE MANAGEMENT/SOCIAL WORK
SW consulted for high PPD score. SW met with mom who states that she has a very good support system and has help at home she has been upset due to baby being in NICU and having 3 more children at home. Mother is aware to contact provided if any s/s develop. Mother was also updated on NICU services including courtesy hotel stay and gas vouchers. Mother is aware to notify NICU nurses with any needs. SW will follow and assist as needed.

## 2024-10-01 NOTE — PAYOR COMM NOTE
"[facility]    Earl Ramirez (29 y.o. Female)       Date of Birth   1995    Social Security Number       Address   [address] [place] [address]    Home Phone   [phone]    MRN   [id number]       Yazidism   Other    Marital Status   Single                            Admission Date   24    Admission Type   Elective    Admitting Provider   Fred Chung DO    Attending Provider       Department, Room/Bed   [facility] MOTHER BABY 2A, M238/1       Discharge Date   2024    Discharge Disposition   Home or Self Care    Discharge Destination                                 Attending Provider: (none)   Allergies: No Known Allergies    Isolation: None   Infection: None   Code Status: Prior    Ht: 160 cm (63\")   Wt: 70.3 kg (155 lb)    Admission Cmt: None   Principal Problem:  labor in third trimester without delivery [O60.03]                   Active Insurance as of 2024       Primary Coverage       Payor Plan Insurance Group Employer/Plan Group    [employer] [place] [employer]        Payor Plan Address Payor Plan Phone Number Payor Plan Fax Number Effective Dates    [address] [address] [phone]  2024 - None Entered    [facility] [id number]         Subscriber Name Subscriber Birth Date Member ID       EARL RAMIREZ 1995 [id number]                     Emergency Contacts            No emergency contacts on file.                 Discharge Summary        Shayna Currie MD at 24 1224          Discharge Summary     Raleigh Ramirez  : 1995  MRN: [id number]  CSN: [id number]    Date of Admission: 2024   Date of Discharge:  2024   Delivering Physician: Fred Chung        Admission Diagnosis: Pregnancy [Z34.90]   labor in third trimester without delivery [O60.03]   labor in third trimester without delivery [O60.03]   Discharge Diagnosis: Pregnancy at 30w1d - delivered       Procedures: 2024  - " Vaginal, Spontaneous       Hospital Course  Patient is a 29 y.o.  who at 30w1d had a uncomplicated vaginal delivery.  Her postpartum course was without complications.  On PPD #2 she was ready for discharge.  She had normal lochia and pain was well controlled with oral medications.    Infant  male  fetus weighing 1610 g (3 lb 8.8 oz)   Apgars -  2 @ 1 minute /  2 @ 5 minutes.    Discharge labs  Lab Results   Component Value Date    WBC 15.27 (H) 2024    HGB 9.5 (L) 2024    HCT 28.8 (L) 2024     2024       Discharge Medications     Discharge Medications        New Medications        Instructions Start Date   ibuprofen 600 MG tablet  Commonly known as: ADVIL,MOTRIN   600 mg, Oral, Every 6 Hours PRN             Continue These Medications        Instructions Start Date   prenatal (CLASSIC) vitamin 28-0.8 MG tablet tablet  Generic drug: prenatal vitamin   1 tablet, Oral, Daily               Discharge Disposition Home or Self Care   Condition on Discharge: good   Follow-up: 4 weeks with [facility], if no appointment, please call office     Shayna Currie MD  2024    Electronically signed by Shayna Currie MD at 24 0295

## 2024-10-02 LAB
CYTO UR: NORMAL
LAB AP CASE REPORT: NORMAL
Lab: NORMAL
PATH REPORT.FINAL DX SPEC: NORMAL
PATH REPORT.GROSS SPEC: NORMAL

## 2024-10-07 ENCOUNTER — LACTATION ENCOUNTER (OUTPATIENT)
Dept: OTHER | Facility: HOSPITAL | Age: 29
End: 2024-10-07

## 2024-10-07 NOTE — LACTATION NOTE
This note was copied from a baby's chart.  Name:Kumar  Day: 9  Dx:  delivery  Birth Gestation:30w1d  Adjusted Gestation:31w3d  Birth weight: 3-8.8 (1610g)  Last weight:    3-8.4 (1600g)  % of weight loss:-0.62%    Feeding Orders: 26 ml MBM or DBM every 3 hours  Maternal Hx:,  delivery x3 ( 35w, 36w and current), first time breastfeeder, chorioamnionitis.  Prenatal Medications:PNV  Pump available:  Spectra with inserts 19mm  Pumping history in the last 24 hours: pumping 10-20ml every 3 hours.     Mother reports she is collecting approximately 10-20 ml every 3 hours using her Spectra pump with 19 mm inserts at home. She reports very slight heaviness but no other changes. She remembers feeling soreness in her breasts with previous deliveries within days of delivery but did not breastfeed. Breasts appear to be heavy and filling, easily hand expressed approximately 1 ml prior to pumping at end of consult. She asked about some lactation drink supplements she had but fenugreek was an ingredient so it was recommended she avoid due to risk with premature infant. Reviewed handouts on increasing supply and power pumping with patient. She states she has had normal post-partum bleeding without clots and no longer cramping. Discussed the concern for retained placenta with only 10 ml production but that she may want to discuss any further causes with her OB/GYN as well as her recommendation on Reglan use with the risk of PP depression. She plans to add hand expression before and after every pump session as well as anytime she is worrying about production. She plans to power pump twice a day. She wants to try Body [employer] Bow drinks and oatmeal in her diet. Recommended re-watching hand expression and all pumping videos from handout. Reviewed hormones and anatomy of milk production. Plan to follow up with her on Thursday 10/10/24.

## 2024-10-09 ENCOUNTER — MATERNAL SCREENING (OUTPATIENT)
Dept: CALL CENTER | Facility: HOSPITAL | Age: 29
End: 2024-10-09
Payer: COMMERCIAL

## 2024-10-09 NOTE — OUTREACH NOTE
Maternal Screening Survey      Flowsheet Row Responses   Facility patient discharged from? [facility]   Attempt successful? Yes   Call start time 1333   Call end time 1337   EPD Scale: Able to Laugh 1-->not quite so much now   EPD Scale: Looked Forward 0-->as much as she ever did   EPD Scale: Blamed Self 1-->not very often   EPD Scale: Been Anxious 2-->yes, sometimes   EPD Scale: Felt Panicky 0-->no, not at all   EPD Scale: Things Getting on Top 2-->yes, sometimes hasn't been coping as well as usual   EPD Scale: Difficulty Sleeping 0-->no, not at all   EPD Scale: Sad or Miserable 0-->no, not at all   EPD Scale: Crying 0-->no, never   EPD Scale: Thought of Harming Self 0-->never   [place]  Depression Score 6   Did any of your parents have problems with alcohol or drug use? No   Do any of your peers have problems with alcohol or drug use? No   Does your partner have problems with alcohol or drug use? No   Before you were pregnant did you have problems with alcohol or drug use? (past) No   In the past month, did you drink beer, wine, liquor or use any other drugs? (pregnancy) No   Maternal Screening call completed Yes   Call end time 1337              LORI HUANG - Registered Nurse

## 2024-10-10 ENCOUNTER — LACTATION ENCOUNTER (OUTPATIENT)
Dept: OTHER | Facility: HOSPITAL | Age: 29
End: 2024-10-10

## 2024-10-10 NOTE — LACTATION NOTE
This note was copied from a baby's chart.  Name:Kumar  Day: 12  Dx:  delivery  Birth Gestation:30w1d  Adjusted Gestation:31w6d  Birth weight: 3-8.8 (1610g)  Last weight:    3-11.6 (1690g)    % of weight loss:    Feeding Orders: 32 ml MBM or DBM every 3 hours  Maternal Hx:,  delivery x3 ( 35w, 36w and current), first time breastfeeder, chorioamnionitis.  Prenatal Medications:PNV  Pump available:  Spectra with inserts 19mm  Pumping history in the last 24 hours: pumping 10-40ml every 3 hours and power pumping twice a day    Mother states she has been power pumping twice a day, adding oatmeal to her diet, and coconut water. She states she had a total of 115 ml in 24 hours a couple days ago and has all her amounts written down for yesterday but not added up. She did pump 40 total from power pumping and has changed from using the 19 mm flange inserts to pumping with the 21 mm flanges after watching the flange fit video. Recommended continuing with 21 flanges and twice a day power pumping along with 8 pumping sessions a day while logging everything. Offered continued support and encouragement.

## 2024-10-15 ENCOUNTER — LACTATION ENCOUNTER (OUTPATIENT)
Dept: OTHER | Facility: HOSPITAL | Age: 29
End: 2024-10-15

## 2024-10-15 NOTE — LACTATION NOTE
This note was copied from a baby's chart.  Name:Kumar  Day: 17  Dx:  delivery  Birth Gestation:30w1d  Adjusted Gestation:32w4d  Birth weight: 3-8.8 (1610g)  Last weight:    3-14.8 (1780g)   % of weight loss:    Feeding Orders: 32 ml MBM or DBM every 3 hours  Maternal Hx:,  delivery x3 ( 35w, 36w and current), first time breastfeeder, chorioamnionitis.  Prenatal Medications:PNV  Pump available:  Spectra with inserts 19mm  Pumping history in the last 24 hours: pumping 10-40ml every 3 hours and power pumping twice a day      Mother has abrasions at base of both nipples and complains of minimal production and pain. Assessed flange fit as she has switched between 19 mm inserts and 21 mm flanges. It is very difficult to see if nipple is centered with inserts as they are thick opaque silicone. Pumped with just 21 mm flanges on at low setting with scant amount of lanolin on broken skin areas. Assisted with adjusting to avoid excess skin from areola in flange. This improved comfort and output. Walked step by step through pumping instructions, massage, hand expression, cleaning, steaming, and assembling pump parts. Black mold noted on yellow pump part between membrane and hard plastic. All pump parts replaced and reviewed the importance of full dissemble and thorough washing. She also ordered pack of inserts for flanges with variety of sizes including 15 and 17 to try. Demonstrated hand expression and she was able to hand express very well with 5-6 drops expressing each time. More milk was collected from this pump session with assistance than any prior so she felt optimistic. Encouraged fully emptying her breast as often as possible using all interventions reviewed and will follow up tomorrow. Offered continued support and encouragement.

## 2024-10-16 ENCOUNTER — LACTATION ENCOUNTER (OUTPATIENT)
Dept: OTHER | Facility: HOSPITAL | Age: 29
End: 2024-10-16

## 2024-10-16 NOTE — LACTATION NOTE
This note was copied from a baby's chart.  Name:Kumar  Day: 18  Dx:  delivery  Birth Gestation:30w1d  Adjusted Gestation:32w45  Birth weight: 3-8.8 (1610g)  Last weight:    3-15.1 (1790g)  % of weight loss:    Feeding Orders: 32 ml MBM or DBM every 3 hours  Maternal Hx:,  delivery x3 ( 35w, 36w and current), first time breastfeeder, chorioamnionitis.  Prenatal Medications:PNV  Pump available:  Spectra with inserts 19mm  Pumping history in the last 24 hours: pumping 20 ml every 3 hours = 160 ml/day      Mother states she is pumping every 3 hours with 21 mm flanges on Spectra pump at home and using small amount of lanolin on abraded nipples. She has an approximate 50 ml increase in her supply. She is awaiting smaller inserts for pump to arrive today. Measured nipples yesterday and recommended 15-17 mm insert. She remains with painful nipples but have not worsened. She asked about needing to pump longer and recommended pumping more frequently if she wanted to, but not longer in duration. For now, she will continue to hands-on double pump for 15-20 minutes every 3 hours. Will continue to follow up on supply and sore nipples.

## 2024-10-19 ENCOUNTER — LACTATION ENCOUNTER (OUTPATIENT)
Dept: OTHER | Facility: HOSPITAL | Age: 29
End: 2024-10-19

## 2024-10-19 NOTE — LACTATION NOTE
"This note was copied from a baby's chart.  Name:Kumar  Day: 21  Dx:  Delivery  Birth Gestation: 30w1d  Adjusted Gestation: 33w1d  Birth weight: 3-8.8 (1610g)  Last weight: 3-14.8 (1780g)  % of weight loss:    Feeding Orders: 32 ml MBM or DBM every 3 hours  Maternal Hx: ,  Delivery x3 ( 35w, 36w and current), First Time Breastfeeder, Chorioamnionitis.  Prenatal Medications: PNV  Pump available: Spectra with inserts 19mm  Pumping history in the last 24 hours: Pumping every 3 hours during the day collecting 20 ml each time. Not pumping at night due to open areas at the base of bilateral nipples.     Mother requested lactation visit at this time. She states her nipples have gotten worse as the areas on her nipples have opened due to continued pumping. States she is omitting pumping at night to \"give them a break\". Noted open areas to the base of bilateral nipples upon assessment. No signs/symptoms of infection. Discussed the need to continue pumping to preserve supply and hopefully increase amounts collected. Discussed lubricating the inside of the flange with lanolin to prevent friction and further trauma, turning the suction down on the pump, leaving breasts open to air as much as possible, keeping area clean, using warm compresses, and making sure nipples are in center the flange. Gave breast shells to keep friction off of nipples while clothed, cool gels for comfort, and more lanolin. Education provided regarding use of all comfort products. Explained the need to focus on healing her nipples as it puts her at risk for infection and later focusing on increasing supply once they are healed. Assured lactation follow up on Monday, 10/21/24. Mother appreciative. Questions denied.        "

## 2024-10-22 ENCOUNTER — LACTATION ENCOUNTER (OUTPATIENT)
Dept: OTHER | Facility: HOSPITAL | Age: 29
End: 2024-10-22

## 2024-10-22 NOTE — LACTATION NOTE
This note was copied from a baby's chart.  F/u with mother. She is currently STS with infant in wrap. She states nipple trauma is improving. She changed flange inserts to 17 mm and feels that it has helped on right breast but still seems too big on left. She states she has a 15 mm flange insert and wondering if she should try it. Advised to attempt using 15 mm on left and assess comfort. Began pumping during the night last night now that nipples are healing. Mother also states her OB gave her an RX for reglan to help with milk supply, but she is aware of side effects, specifically PPD and is hesitant to use reglan. Discussed reglan as a option for increasing supply but encouraged her to inform SO of risks of PPD so that he may be more aware if he notices emotional or behavioral changes in mother. Also discussed inappropriate flange fit and pumping discomfort can also lead to low supply. Encouragement and support provided.

## 2024-10-23 ENCOUNTER — LACTATION ENCOUNTER (OUTPATIENT)
Dept: OTHER | Facility: HOSPITAL | Age: 29
End: 2024-10-23

## 2024-10-23 NOTE — LACTATION NOTE
This note was copied from a baby's chart.  Name:Kumar  Day: 25  Dx:  Delivery  Birth Gestation: 30w1d  Adjusted Gestation: 33w5d  Birth weight: 3-8.8 (1610g)  Last weight: 4-1.4  % of weight loss:    Feeding Orders: 32 ml MBM or DBM every 3 hours  Maternal Hx: ,  Delivery x3 ( 35w, 36w and current), First Time Breastfeeder, Chorioamnionitis.  Prenatal Medications: PNV  Pump available: Spectra with inserts 19mm  Pumping history in the last 24 hours: Pumping every 3 hours       Infant had first attempt at breastfeeding. Infant moved to mother's chest skin to skin and began moving toward left breast and independently latched and began sucking. Nasal congestion noted. Infant released the breast and began crying. After multiple attempts at latching and sucking 3-4 times, infant appeared to be releasing the breast to mouth breath. Nurse added drops of saline to each nare and bulb suctioned with thick secretions removed. Infant remained fussy but did continue to latch repeatedly and suck intermittently with TF infusing. Mother able to hand express drops with each attempt. Infant extremely active at the breast grabbing breast with hangs, rooting, latching, crawling up mother. Infant would calm for a few seconds and do it again. Discussed expectation of 33 week infant at breast for the first time may be sleepy and lick drops from nipple or latch with minimal sucking. Compared this to Kumar's activity. Prepared mother that infant may be exhausted from being so active during feeding. Reviewed positioning and latching techniques and encouraged hand expression throughout the feeding with breast compression when latched. Recommended pumping immediately after feeding and continuing with plan of care for healing her nipples and building supply. Offered continued support and encouragement.

## 2024-10-25 ENCOUNTER — LACTATION ENCOUNTER (OUTPATIENT)
Dept: OTHER | Facility: HOSPITAL | Age: 29
End: 2024-10-25

## 2024-10-25 NOTE — LACTATION NOTE
This note was copied from a baby's chart.  Name:Kumar  Day: 27  Dx:  Delivery  Birth Gestation: 30w1d  Adjusted Gestation: 34w0d  Birth weight: 3-8.8 (1610g)  Last weight: 4-3.7 (1920g)      Feeding Orders: 34 ml MBM or DBM every 3 hours  Maternal Hx: ,  Delivery x3 ( 35w, 36w and current), First Time Breastfeeder, Chorioamnionitis.  Prenatal Medications: PNV  Pump available: Spectra with inserts 17 mm  Pumping history in the last 24 hours: Pumping every 3 hours collecting 10-20 ml      Mother states she wishes to pump only and no longer direct breastfeed. Discussed this feeding plan with her and offered support. Assisted with pumping immediately after skin to skin with hands on pumping and collected 10 ml. Breasts soft prior to pump session and right nipple without trauma or redness. Left nipple with small dry healing scab, no redness. Demonstrated how to compress breasts during pumping sessions. Explained how to make pumping bra with sports bra. Discussed the importance of infant continuing to receive her breastmilk wether it is one drop or 100. Praised mother for continuing despite her difficulties. Discussed risk factors that can cause inadequate supply and sometimes it is difficult to know why some mothers do not have an adequate supply. Recommended continued use of interventions for increasing supply as desired. Offered continued support and encouragement.

## 2024-11-06 ENCOUNTER — LACTATION ENCOUNTER (OUTPATIENT)
Dept: OTHER | Facility: HOSPITAL | Age: 29
End: 2024-11-06

## 2024-11-06 NOTE — LACTATION NOTE
This note was copied from a baby's chart.  Name:Kumar  Day: 5 weeks  Dx:  Delivery  Birth Gestation:30w1d  Adjusted Gestation:32w45  Birth weight: 3-8.8 (1610g)  Last weight: 4-14.3 (2220g)  % of weight loss:    Feeding Orders: Enfamil AR  Maternal Hx: ,  delivery x3 ( 35w, 36w and current), first time breastfeeder, chorioamnionitis.  Prenatal Medications: PNV  Pump available: Spectra with inserts 19mm  Pumping history in the last 24 hours: No longer pumping    Mother reports she is no longer pumping and is happy with her decision. Affirmed mother of her decision and assured of support. Mother denied any issues with suppression. Questions/concerns denied.